# Patient Record
Sex: MALE | URBAN - METROPOLITAN AREA
[De-identification: names, ages, dates, MRNs, and addresses within clinical notes are randomized per-mention and may not be internally consistent; named-entity substitution may affect disease eponyms.]

---

## 2022-03-14 ENCOUNTER — OFFICE VISIT (OUTPATIENT)
Dept: URBAN - METROPOLITAN AREA SURGERY CENTER 16 | Facility: SURGERY CENTER | Age: 52
End: 2022-03-14

## 2022-03-16 PROBLEM — 275978004 COLON CANCER SCREENING: Status: ACTIVE | Noted: 2022-03-16

## 2022-04-04 ENCOUNTER — OFFICE VISIT (OUTPATIENT)
Dept: URBAN - METROPOLITAN AREA SURGERY CENTER 16 | Facility: SURGERY CENTER | Age: 52
End: 2022-04-04
Payer: COMMERCIAL

## 2022-04-04 DIAGNOSIS — D12.5 ADENOMA OF SIGMOID COLON: ICD-10-CM

## 2022-04-04 DIAGNOSIS — Z12.11 COLON CANCER SCREENING: ICD-10-CM

## 2022-04-04 PROCEDURE — G8907 PT DOC NO EVENTS ON DISCHARG: HCPCS | Performed by: INTERNAL MEDICINE

## 2022-04-04 PROCEDURE — 45385 COLONOSCOPY W/LESION REMOVAL: CPT | Performed by: INTERNAL MEDICINE

## 2023-03-14 ENCOUNTER — DASHBOARD ENCOUNTERS (OUTPATIENT)
Age: 53
End: 2023-03-14

## 2023-03-15 ENCOUNTER — OFFICE VISIT (OUTPATIENT)
Dept: URBAN - METROPOLITAN AREA TELEHEALTH 2 | Facility: TELEHEALTH | Age: 53
End: 2023-03-15
Payer: COMMERCIAL

## 2023-03-15 ENCOUNTER — TELEPHONE ENCOUNTER (OUTPATIENT)
Dept: URBAN - METROPOLITAN AREA CLINIC 35 | Facility: CLINIC | Age: 53
End: 2023-03-15

## 2023-03-15 ENCOUNTER — TELEPHONE ENCOUNTER (OUTPATIENT)
Dept: URBAN - METROPOLITAN AREA CLINIC 25 | Facility: CLINIC | Age: 53
End: 2023-03-15

## 2023-03-15 VITALS — HEIGHT: 73 IN | WEIGHT: 230 LBS | BODY MASS INDEX: 30.48 KG/M2

## 2023-03-15 DIAGNOSIS — K57.90 DIVERTICULOSIS: ICD-10-CM

## 2023-03-15 DIAGNOSIS — R19.5 LOOSE STOOLS: ICD-10-CM

## 2023-03-15 PROBLEM — 397881000: Status: ACTIVE | Noted: 2023-03-15

## 2023-03-15 PROCEDURE — 99213 OFFICE O/P EST LOW 20 MIN: CPT | Performed by: INTERNAL MEDICINE

## 2023-03-15 RX ORDER — AZELASTINE HCL 205.5 UG/1
SPRAY TWO (2) SQUIRTS IN EACH NOSTRILS TWICE A DAY SPRAY NASAL
Qty: 30 UNSPECIFIED | Refills: 0 | Status: ON HOLD | COMMUNITY

## 2023-03-15 RX ORDER — DOXYCYCLINE HYCLATE 100 MG/1
TAKE ONE (1) CAPSULE(S) BY MOUTH TWICE A DAY FOR 5 DAYS CAPSULE ORAL
Qty: 10 UNSPECIFIED | Refills: 0 | Status: ON HOLD | COMMUNITY

## 2023-03-15 NOTE — HPI-TODAY'S VISIT:
Mr. Gee is a 52y M who presents today w/ complaints of loose stools and a bout of food poisoning that was 2 weeks ago, he treated his symptoms with immodium and Metamucil and states it was somewhat better, but returned to being loose and watery. The patient states that stools are very voluminous and watery. Took abx in December for URI. Only trigger he can remember is shrimp consumption which was undercooked.  Confirms foul smelling stools at times. Denies hospitalizations, raw meats, suspect water consumption, travel, N/V, BRBPR, unintentional weight loss, dysphagia, fever, or SOB/CP.   Colon 2022  - The distal rectum and anal verge are normal on retroflexion view. - Two 4 to 5 mm polyps in the sigmoid colon, removed with a cold snare. Resected and retrieved. - Diverticulosis in the sigmoid colon and in the descending colon. - The examined portion of the ileum was normal. - Multiple fragments of sessile serrated adenoma/polyp(s).

## 2023-03-20 PROBLEM — 428283002: Status: ACTIVE | Noted: 2023-03-20
